# Patient Record
Sex: FEMALE | Race: WHITE | NOT HISPANIC OR LATINO
[De-identification: names, ages, dates, MRNs, and addresses within clinical notes are randomized per-mention and may not be internally consistent; named-entity substitution may affect disease eponyms.]

---

## 2019-07-02 PROBLEM — Z00.00 ENCOUNTER FOR PREVENTIVE HEALTH EXAMINATION: Status: ACTIVE | Noted: 2019-07-02

## 2019-08-01 ENCOUNTER — APPOINTMENT (OUTPATIENT)
Dept: NEPHROLOGY | Facility: CLINIC | Age: 79
End: 2019-08-01
Payer: MEDICARE

## 2019-08-01 ENCOUNTER — LABORATORY RESULT (OUTPATIENT)
Age: 79
End: 2019-08-01

## 2019-08-01 VITALS — SYSTOLIC BLOOD PRESSURE: 126 MMHG | HEART RATE: 60 BPM | HEIGHT: 59 IN | DIASTOLIC BLOOD PRESSURE: 80 MMHG

## 2019-08-01 PROCEDURE — 36415 COLL VENOUS BLD VENIPUNCTURE: CPT

## 2019-08-01 PROCEDURE — 99204 OFFICE O/P NEW MOD 45 MIN: CPT | Mod: 25

## 2019-08-01 NOTE — PHYSICAL EXAM
[General Appearance - Alert] : alert [Sclera] : the sclera and conjunctiva were normal [PERRL With Normal Accommodation] : pupils were equal in size, round, and reactive to light [General Appearance - In No Acute Distress] : in no acute distress [Outer Ear] : the ears and nose were normal in appearance [Hearing Threshold Finger Rub Not [place]] : hearing was normal [] : no respiratory distress [Neck Appearance] : the appearance of the neck was normal [Neck Cervical Mass (___cm)] : no neck mass was observed [Apical Impulse] : the apical impulse was normal [Heart Sounds] : normal S1 and S2 [Oriented To Time, Place, And Person] : oriented to person, place, and time [Cranial Nerves] : cranial nerves 2-12 were intact [Affect] : the affect was normal

## 2019-08-01 NOTE — HISTORY OF PRESENT ILLNESS
[FreeTextEntry1] : Pleasant 77 yo female accompanied by dtr referred by JOSE DANIEL Erickson from [facility] for eval of an elevated creatinine - labs in 2/2019 showed cr was 1.4, in April was 1.3 and in June 1.5; pt has underlying HTN, no DM - had taken NSAIDs for many years but stopped many years ago - ofnote dtr acknowledges she snores heavily and often chokes or stops breathing -

## 2019-08-01 NOTE — ASSESSMENT
[FreeTextEntry1] : Pleasant 77 yo female accompanied by dtr referred by JOSE DANIEL Erickson from [facility] for eval of an elevated creatinine - labs in 2/2019 showed cr was 1.4, in April was 1.3 and in June 1.5; pt has underlying HTN, no DM - had taken NSAIDs for many years but stopped many years ago - of note dtr acknowledges she snores heavily and often chokes or stops breathing - \par \par Renal function grossly unchanged for past several months, No DM, BP well controlled, suspect reduced function /loss of function occurred in the past from chronic heavy use of NSIADs - while she no longer uses them, it did exact a toll on her renal function - does not appear to have any active disease at this time, will check ua to see if rbc or protein present - counseled on avoidance of NSAIDs - of arguably greater importance is possibility she has JOSSELIN ( based on reported sx) which , if present should be treated as this will affect weight, mood, bp, etc - advised she f/u with her pcp to have study done \par \par f/u 4-6 months or sooner dep on resutls of labs

## 2019-08-07 LAB
ANION GAP SERPL CALC-SCNC: 14 MMOL/L
BUN SERPL-MCNC: 36 MG/DL
CALCIUM SERPL-MCNC: 9.4 MG/DL
CHLORIDE SERPL-SCNC: 107 MMOL/L
CO2 SERPL-SCNC: 22 MMOL/L
CREAT SERPL-MCNC: 1.42 MG/DL
GLUCOSE SERPL-MCNC: 80 MG/DL
POTASSIUM SERPL-SCNC: 4.6 MMOL/L
SODIUM SERPL-SCNC: 142 MMOL/L

## 2019-08-13 LAB
APPEARANCE: CLEAR
BASOPHILS # BLD AUTO: 0.05 K/UL
BASOPHILS NFR BLD AUTO: 0.7 %
BILIRUBIN URINE: NEGATIVE
BLOOD URINE: NEGATIVE
COLOR: NORMAL
CREAT SPEC-SCNC: 87 MG/DL
CREAT/PROT UR: 0.1 RATIO
EOSINOPHIL # BLD AUTO: 0.15 K/UL
EOSINOPHIL NFR BLD AUTO: 2.2 %
GLUCOSE QUALITATIVE U: NEGATIVE
HCT VFR BLD CALC: 46.6 %
HGB BLD-MCNC: 14.3 G/DL
IMM GRANULOCYTES NFR BLD AUTO: 0.6 %
KETONES URINE: NEGATIVE
LEUKOCYTE ESTERASE URINE: ABNORMAL
LYMPHOCYTES # BLD AUTO: 2.88 K/UL
LYMPHOCYTES NFR BLD AUTO: 43 %
MAN DIFF?: NORMAL
MCHC RBC-ENTMCNC: 28.3 PG
MCHC RBC-ENTMCNC: 30.7 GM/DL
MCV RBC AUTO: 92.1 FL
MONOCYTES # BLD AUTO: 0.46 K/UL
MONOCYTES NFR BLD AUTO: 6.9 %
NEUTROPHILS # BLD AUTO: 3.11 K/UL
NEUTROPHILS NFR BLD AUTO: 46.6 %
NITRITE URINE: NEGATIVE
PH URINE: 6
PLATELET # BLD AUTO: 188 K/UL
PROT UR-MCNC: 8 MG/DL
PROTEIN URINE: NEGATIVE
RBC # BLD: 5.06 M/UL
RBC # FLD: 14 %
SPECIFIC GRAVITY URINE: 1.02
URATE SERPL-MCNC: 5 MG/DL
UROBILINOGEN URINE: NORMAL
WBC # FLD AUTO: 6.69 K/UL

## 2019-08-16 ENCOUNTER — TRANSCRIPTION ENCOUNTER (OUTPATIENT)
Age: 79
End: 2019-08-16

## 2021-07-29 ENCOUNTER — RESULT REVIEW (OUTPATIENT)
Age: 81
End: 2021-07-29

## 2021-07-29 ENCOUNTER — APPOINTMENT (OUTPATIENT)
Dept: NEPHROLOGY | Facility: CLINIC | Age: 81
End: 2021-07-29
Payer: MEDICARE

## 2021-07-29 VITALS
HEIGHT: 59 IN | OXYGEN SATURATION: 98 % | HEART RATE: 76 BPM | WEIGHT: 142 LBS | TEMPERATURE: 97.3 F | SYSTOLIC BLOOD PRESSURE: 140 MMHG | DIASTOLIC BLOOD PRESSURE: 80 MMHG | BODY MASS INDEX: 28.63 KG/M2

## 2021-07-29 DIAGNOSIS — T39.395A ADVERSE EFFECT OF OTHER NONSTEROIDAL ANTI-INFLAMMATORY DRUGS [NSAID], INITIAL ENCOUNTER: ICD-10-CM

## 2021-07-29 DIAGNOSIS — N18.30 CHRONIC KIDNEY DISEASE, STAGE 3 UNSPECIFIED: ICD-10-CM

## 2021-07-29 DIAGNOSIS — G47.33 OBSTRUCTIVE SLEEP APNEA (ADULT) (PEDIATRIC): ICD-10-CM

## 2021-07-29 PROCEDURE — 99214 OFFICE O/P EST MOD 30 MIN: CPT

## 2021-07-29 NOTE — REASON FOR VISIT
[Initial Evaluation] : an initial evaluation [Family Member] : family member [FreeTextEntry1] : eval for ckd

## 2021-07-29 NOTE — ASSESSMENT
[FreeTextEntry1] : Pleasant 79 yo female accompanied by grandson who was previously referred by JOSE DANIEL Erickson from [facility] for eval of an elevated creatinine \par \par - labs in 2/2019 showed cr was 1.4, in April was 1.3 and in June 1.5 2019; at recent visit with FNP cr noted to have increased to 1.7 so referred back - pt volunteers she wasn’t/doesn’t drink water\par \par - last seen in 2019, has underlying DM/HTN\par - had taken NSAIDs for many years but stopped many years ago\par \par  - of note last visit dtr acknowledges she snores heavily and often chokes or stops breathing - was referred for sleep study, but didn’t go, will refer again for sleep study\par \par Renal function grossly unchanged previously, No DM, BP well controlled, suspect reduced function /loss of function occurred in the past from chronic heavy use of NSIADs - while she no longer uses them, it did exact a toll on her renal function - does not appear to have any active disease at this time\par \par  - counseled on avoidance of NSAIDs - of arguably greater importance is possibility she has JOSSELIN ( based on reported sx) which , if present should be treated as this will affect weight, mood, bp, etc - advised she f/u with her pcp to have study done \par \par f/u 4-6 months or sooner dep on results of labs

## 2021-07-29 NOTE — HISTORY OF PRESENT ILLNESS
[FreeTextEntry1] : Pleasant 79 yo female accompanied by dtr referred by JOSE DANIEL Erickson from [facility] for eval of an elevated creatinine - labs in 2/2019 showed cr was 1.4, in April was 1.3 and in June 1.5; pt has underlying HTN, no DM - had taken NSAIDs for many years but stopped many years ago - ofnote dtr acknowledges she snores heavily and often chokes or stops breathing -

## 2021-07-29 NOTE — PHYSICAL EXAM
[General Appearance - Alert] : alert [General Appearance - In No Acute Distress] : in no acute distress [Sclera] : the sclera and conjunctiva were normal [PERRL With Normal Accommodation] : pupils were equal in size, round, and reactive to light [Outer Ear] : the ears and nose were normal in appearance [Hearing Threshold Finger Rub Not [place]] : hearing was normal [Neck Appearance] : the appearance of the neck was normal [Neck Cervical Mass (___cm)] : no neck mass was observed [] : no respiratory distress [Apical Impulse] : the apical impulse was normal [Heart Sounds] : normal S1 and S2 [Cranial Nerves] : cranial nerves 2-12 were intact [Oriented To Time, Place, And Person] : oriented to person, place, and time [Affect] : the affect was normal

## 2021-09-27 ENCOUNTER — RESULT REVIEW (OUTPATIENT)
Age: 81
End: 2021-09-27

## 2021-12-11 ENCOUNTER — RESULT REVIEW (OUTPATIENT)
Age: 81
End: 2021-12-11

## 2022-01-28 ENCOUNTER — RESULT REVIEW (OUTPATIENT)
Age: 82
End: 2022-01-28

## 2022-12-18 ENCOUNTER — APPOINTMENT (OUTPATIENT)
Dept: AFTER HOURS CARE | Facility: EMERGENCY ROOM | Age: 82
End: 2022-12-18

## 2022-12-18 DIAGNOSIS — U07.1 COVID-19: ICD-10-CM

## 2022-12-18 PROCEDURE — 99204 OFFICE O/P NEW MOD 45 MIN: CPT | Mod: 95

## 2022-12-18 RX ORDER — NIRMATRELVIR AND RITONAVIR 150-100 MG
10 X 150 MG & KIT ORAL
Qty: 10 | Refills: 0 | Status: ACTIVE | COMMUNITY
Start: 2022-12-18 | End: 1900-01-01

## 2022-12-19 NOTE — PLAN
[With new medications prescribed] : Treat in place: with new medications prescribed [FreeTextEntry1] : COVID day 2, fully vaccinated with age CKD and HTN as risk factors. Comfortable respirations on RA. Tolerating PO. Pros and cons of Paxlovid discussed. [place] interaction  run. Hold atarvostating 8 days. Warning paxlovid may reduce efficacy of bupropion given.

## 2022-12-19 NOTE — HISTORY OF PRESENT ILLNESS
[Home] : at home, [unfilled] , at the time of the visit. [Other Location: e.g. Home (Enter Location, City,State)___] : at [unfilled] [Verbal consent obtained from patient] : the patient, [unfilled] [FreeTextEntry8] : 82F COVID day2, Home rapid test positive today. Sx: stuffy nose, hoarse voice, chills, mild cough No N/V/D, no loss of taste or smell, No SOB Vaccine status: Vaccinated x4 PMH: CKD stage 3, hypothyroidism, HTN, depression Meds: Bystolic, atorvastatin, synthroid, bupropion, sertraline

## 2022-12-19 NOTE — ASSESSMENT
[FreeTextEntry1] : COVID day 2, fully vaccinated with age CKD and HTN as risk factors. Comfortable respirations on RA. Tolerating PO. Pros and cons of Paxlovid discussed. [place] interaction  run. Hold atarvostating 8 days. Warning paxlovid may reduce efficacy of bupropion given.
